# Patient Record
Sex: FEMALE | Race: WHITE
[De-identification: names, ages, dates, MRNs, and addresses within clinical notes are randomized per-mention and may not be internally consistent; named-entity substitution may affect disease eponyms.]

---

## 2023-03-19 ENCOUNTER — HOSPITAL ENCOUNTER (EMERGENCY)
Dept: HOSPITAL 33 - ED | Age: 23
Discharge: HOME | End: 2023-03-19
Payer: COMMERCIAL

## 2023-03-19 VITALS — SYSTOLIC BLOOD PRESSURE: 100 MMHG | DIASTOLIC BLOOD PRESSURE: 67 MMHG

## 2023-03-19 VITALS — HEART RATE: 81 BPM

## 2023-03-19 VITALS — OXYGEN SATURATION: 98 %

## 2023-03-19 DIAGNOSIS — R11.0: ICD-10-CM

## 2023-03-19 DIAGNOSIS — Z79.899: ICD-10-CM

## 2023-03-19 DIAGNOSIS — Z79.52: ICD-10-CM

## 2023-03-19 DIAGNOSIS — J02.9: ICD-10-CM

## 2023-03-19 DIAGNOSIS — L27.0: Primary | ICD-10-CM

## 2023-03-19 DIAGNOSIS — T42.6X5A: ICD-10-CM

## 2023-03-19 LAB
ALBUMIN SERPL-MCNC: 3.8 G/DL (ref 3.5–5)
ALP SERPL-CCNC: 67 U/L (ref 38–126)
ALT SERPL-CCNC: 20 U/L (ref 0–35)
ANION GAP SERPL CALC-SCNC: 8.7 MEQ/L (ref 5–15)
AST SERPL QL: 23 U/L (ref 14–36)
BASOPHILS # BLD AUTO: 0.07 X10^3/UL (ref 0–0.4)
BASOPHILS NFR BLD AUTO: 0.9 % (ref 0–0.4)
BILIRUB BLD-MCNC: 0.4 MG/DL (ref 0.2–1.3)
BUN SERPL-MCNC: 11 MG/DL (ref 7–17)
CALCIUM SPEC-MCNC: 8.6 MG/DL (ref 8.4–10.2)
CHLORIDE SERPL-SCNC: 107 MMOL/L (ref 98–107)
CO2 SERPL-SCNC: 28 MMOL/L (ref 22–30)
CREAT SERPL-MCNC: 0.66 MG/DL (ref 0.52–1.04)
EOSINOPHIL # BLD AUTO: 0.5 X10^3/UL (ref 0–0.5)
FLUAV AG NPH QL IA: NEGATIVE
FLUBV AG NPH QL IA: NEGATIVE
GFR SERPLBLD BASED ON 1.73 SQ M-ARVRAT: > 60 ML/MIN
GLUCOSE SERPL-MCNC: 84 MG/DL (ref 74–106)
HCT VFR BLD AUTO: 37.5 % (ref 35–47)
HGB BLD-MCNC: 12.4 G/DL (ref 12–16)
IMM GRANULOCYTES # BLD: 0.01 X10^3U/L (ref 0–0.03)
IMM GRANULOCYTES NFR BLD: 0.1 % (ref 0–0.4)
LYMPHOCYTES # SPEC AUTO: 2.17 X10^3/UL (ref 1–4.6)
MCH RBC QN AUTO: 29.7 PG (ref 26–32)
MCHC RBC AUTO-ENTMCNC: 33.1 G/DL (ref 32–36)
MONOCYTES # BLD AUTO: 0.42 X10^3/UL (ref 0–1.3)
NRBC # BLD AUTO: 0 X10^3U/L (ref 0–0.01)
NRBC BLD AUTO-RTO: 0 % (ref 0–0.1)
PLATELET # BLD AUTO: 241 X10^3/UL (ref 150–450)
POTASSIUM SERPLBLD-SCNC: 3.2 MMOL/L (ref 3.5–5.1)
PROT SERPL-MCNC: 6.7 G/DL (ref 6.3–8.2)
RBC # BLD AUTO: 4.17 X10^6/UL (ref 4.1–5.4)
RSV AG SPEC QL IA: NEGATIVE
SARS-COV-2 AG RESP QL IA.RAPID: NEGATIVE
SODIUM SERPL-SCNC: 140 MMOL/L (ref 137–145)
WBC # BLD AUTO: 7.5 X10^3/UL (ref 4–10.5)

## 2023-03-19 PROCEDURE — 85025 COMPLETE CBC W/AUTO DIFF WBC: CPT

## 2023-03-19 PROCEDURE — 36415 COLL VENOUS BLD VENIPUNCTURE: CPT

## 2023-03-19 PROCEDURE — 82040 ASSAY OF SERUM ALBUMIN: CPT

## 2023-03-19 PROCEDURE — 84075 ASSAY ALKALINE PHOSPHATASE: CPT

## 2023-03-19 PROCEDURE — 80048 BASIC METABOLIC PNL TOTAL CA: CPT

## 2023-03-19 PROCEDURE — 96372 THER/PROPH/DIAG INJ SC/IM: CPT

## 2023-03-19 PROCEDURE — 87651 STREP A DNA AMP PROBE: CPT

## 2023-03-19 PROCEDURE — 82247 BILIRUBIN TOTAL: CPT

## 2023-03-19 PROCEDURE — 83521 IG LIGHT CHAINS FREE EACH: CPT

## 2023-03-19 PROCEDURE — 99283 EMERGENCY DEPT VISIT LOW MDM: CPT

## 2023-03-19 PROCEDURE — 0241U: CPT

## 2023-03-19 PROCEDURE — 84460 ALANINE AMINO (ALT) (SGPT): CPT

## 2023-03-19 PROCEDURE — 84450 TRANSFERASE (AST) (SGOT): CPT

## 2023-03-19 NOTE — ERPHSYRPT
- History of Present Illness


Source: patient, other (Mother)


Exam Limitations: no limitations


Patient Subjective Stated Complaint: allergic reaction to lamictil, was stopped 

on Wednesday cold turkey.  The rash is somewhat better but I feel like there's 

little bugs crawling on the inside, I'm itching so bad.


Triage Nursing Assessment: pt ambulated into ER without diff, mom at bedside.  

Pt had an allergic reaction to lamictil and was seen at [facility] on Wed, 

03/15/23.  Pt's rash is slightly improved but states, "I'm itching so bad and I 

can't get any relief from it".  Pt has red blotchy rash to arms, legs, belly and

back.  Pt c/o headache.


Physician History: 





23 yo WF w rash x2 wks. Pt was seen in [place] ER on 3/15/23, and it was presumed 

to be caused by Lamictal which was stopped. She was started on a tapering course

of steroids, and Lamictal was stopped. Pt states that the rash is better, but 

pruritis has increased. She has mild nausea and ST but denies 

fever/cough/coryza/swelling of tongue/dysphagia/dyspnea. Pt does have some yared-

orbital edema. 


Timing/Duration: week(s) (2 wks)


Quality: itchy


Severity: mild


Location: generalized


Possible Causes: other (Possible Lamictal)


Associated Symptoms: denies symptoms, sore throat


Allergies/Adverse Reactions: 








lamotrigine [From Lamictal] Adverse Reaction (Severe, Verified 03/19/23 20:41)


   Rash





Home Medications: 








Buspirone HCl 5 mg*** [Buspar 5 mg***] 20 mg PO BID 03/19/23 [History]


Ethinyl Estradiol 1 gm PO DAILY 03/19/23 [History]


Loratadine 10 mg*** [Claritin 10 mg***] 10 mg PO DAILY 03/19/23 [History]


Prednisone 20 mg*** [Deltasone 20 mg***] 40 mg PO DAILY 03/19/23 [History]





Hx Tetanus, Diphtheria Vaccination/Date Given: Yes


Hx Influenza Vaccination/Date Given: No


Hx Pneumococcal Vaccination/Date Given: No





Travel Risk





- International Travel


Have you traveled outside of the country in past 3 weeks: No





- Coronavirus Screening


Are you exhibiting any of the following symptoms?: No


Close contact with a COVID-19 positive Pt in past 14-21 Days: No





- Vaccine Status


Have you recieved a Covid-19 vaccination: Yes


: Pfizer





- Vaccination Dates


Date of 2cond Vaccination (if applicable): 06/09/21





- Review of Systems


Constitutional: No Symptoms


Eyes: No Symptoms, Itchy


Ears, Nose, & Throat: No Symptoms


Respiratory: No Symptoms


Cardiac: No Symptoms


Abdominal/Gastrointestinal: No Symptoms


Genitourinary Symptoms: No Symptoms


Musculoskeletal: No Symptoms


Skin: No Symptoms, Rash


Neurological: No Symptoms


Psychological: No Symptoms


Endocrine: No Symptoms


Hematologic/Lymphatic: No Symptoms


Immunological/Allergic: No Symptoms





- Past Medical History


Neurological History: No Pertinent History


ENT History: No Pertinent History


Cardiac History: No Pertinent History


Respiratory History: No Pertinent History


Endocrine Medical History: No Pertinent History


Musculoskeletal History: No Pertinent History


GI Medical History: No Pertinent History


 History: No Pertinent History


Psycho-Social History: Anxiety, Depression


Female Reproductive Disorders: No Pertinent History





- Past Surgical History


Past Surgical History: No





- Social History


Smoking Status: Former smoker


Exposure to second hand smoke: Yes


Drug Use: none


Patient Lives Alone: No


Significant Family History: no pertinent family hx





- Female History


Hx Last Menstrual Period: 03/01/23


Hx Pregnant Now: No





- Nursing Vital Signs


Nursing Vital Signs: 


                               Initial Vital Signs











Temperature  99.4 F   03/19/23 20:28


 


Pulse Rate  104 H  03/19/23 20:28


 


Respiratory Rate  16   03/19/23 20:28


 


Blood Pressure  127/77   03/19/23 20:28


 


O2 Sat by Pulse Oximetry  98   03/19/23 20:28








                                   Pain Scale











Pain Intensity                 8











Mildly tachy





- Physical Exam


General Appearance: no apparent distress


Eye Exam: PERRL/EOMI, other (Mild yared-orbital edema)


Ears, Nose, Throat Exam: normal ENT inspection, TMs normal, pharynx normal (Mild

 erythema), moist mucous membranes


Neck Exam: normal inspection, non-tender, supple, full range of motion


Respiratory Exam: normal breath sounds, lungs clear, airway intact


Cardiovascular Exam: normal heart sounds, normal peripheral pulses, tachycardia 

(Mild)


Gastrointestinal/Abdomen Exam: soft, normal bowel sounds, No tenderness


Back Exam: normal range of motion


Extremity Exam: normal range of motion


Neurologic Exam: alert, oriented x 3, cooperative, CNs II-XII nml as tested, 

normal mood/affect, nml cerebellar function, nml station & gait, sensation nml, 

No motor deficits, No sensory deficit


Skin Exam: rash (Diffuse, blanching erythematous rash)


Lymphatic Exam: No adenopathy


**SpO2 Interpretation**: normal


SpO2: 98


O2 Delivery: Room Air





- Course


Nursing assessment & vital signs reviewed: Yes


Ordered Tests: 


                               Active Orders 24 hr











 Category Date Time Status


 


 ALBUMIN Stat Lab  03/19/23 21:47 Completed


 


 ALKALINE PHOSPHATASE Stat Lab  03/19/23 21:47 Completed


 


 BILIRUBIN,TOTAL Stat Lab  03/19/23 21:47 Completed


 


 BMP Stat Lab  03/19/23 21:24 Completed


 


 CBC W DIFF Stat Lab  03/19/23 21:24 Completed


 


 SGOT/AST Stat Lab  03/19/23 21:47 Completed


 


 SGPT/ALT Stat Lab  03/19/23 21:47 Completed


 


 Total Protein Stat Lab  03/19/23 21:47 Completed








Medication Summary














Discontinued Medications














Generic Name Dose Route Start Last Admin





  Trade Name Freq  PRN Reason Stop Dose Admin


 


Dexamethasone Sodium Phosphate  10 mg  03/19/23 22:19  03/19/23 22:22





  Dexamethasone Sod Phosphate 10 Mg/Ml  IM  03/19/23 22:20  10 mg





  STAT ONE   Administration


 


Dexamethasone Sodium Phosphate  Confirm  03/19/23 22:20 





  Dexamethasone Sod Phosphate 10 Mg/Ml  Administered  03/19/23 22:21 





  Dose  





  10 mg  





  .ROUTE  





  .STK-MED ONE  


 


Hydroxyzine HCl  50 mg  03/19/23 21:07  03/19/23 21:17





  Hydroxyzine Hcl 100 Mg/2 Ml Vial  IM  03/19/23 21:08  50 mg





  STAT ONE   Administration


 


Hydroxyzine HCl  Confirm  03/19/23 21:17 





  Hydroxyzine Hcl 100 Mg/2 Ml Vial  Administered  03/19/23 21:18 





  Dose  





  100 mg  





  IM  





  .STK-MED ONE  











Lab/Rad Data: 


                           Laboratory Result Diagrams





                                 03/19/23 21:24 





                                 03/19/23 21:24 





                               Laboratory Results











  03/19/23 03/19/23 03/19/23 Range/Units





  21:47 21:24 21:24 


 


WBC     (4.0-10.5)  x10^3/uL


 


RBC     (4.1-5.4)  x10^6/uL


 


Hgb     (12.0-16.0)  g/dL


 


Hct     (35-47)  %


 


MCV     ()  fL


 


MCH     (26-32)  pg


 


MCHC     (32-36)  g/dL


 


RDW     (11.5-14.0)  %


 


Plt Count     (150-450)  x10^3/uL


 


MPV     (7.5-11.0)  fL


 


Gran %     (36.0-66.0)  %


 


Immature Gran % (Auto)     (0.00-0.4)  %


 


Nucleat RBC Rel Count     (0.00-0.1)  %


 


Eos # (Auto)     (0-0.5)  x10^3/uL


 


Immature Gran # (Auto)     (0.00-0.03)  x10^3u/L


 


Absolute Lymphs (auto)     (1.0-4.6)  x10^3/uL


 


Absolute Monos (auto)     (0.0-1.3)  x10^3/uL


 


Absolute Nucleated RBC     (0.00-0.01)  x10^3u/L


 


Lymphocytes %     (24.0-44.0)  %


 


Monocytes %     (0.0-12.0)  %


 


Eosinophils %     (0.00-5.0)  %


 


Basophils %     (0.0-0.4)  %


 


Absolute Granulocytes     (1.4-6.9)  x10^3/uL


 


Basophils #     (0-0.4)  x10^3/uL


 


Sodium     (137-145)  mmol/L


 


Potassium     (3.5-5.1)  mmol/L


 


Chloride     ()  mmol/L


 


Carbon Dioxide     (22-30)  mmol/L


 


Anion Gap     (5-15)  MEQ/L


 


BUN     (7-17)  mg/dL


 


Creatinine     (0.52-1.04)  mg/dL


 


Estimated GFR     ML/MIN


 


Glucose     ()  mg/dL


 


Calcium     (8.4-10.2)  mg/dL


 


Total Bilirubin  0.40    (0.2-1.3)  mg/dL


 


AST  23    (14-36)  U/L


 


ALT  20    (0-35)  U/L


 


Alkaline Phosphatase  67    ()  U/L


 


Serum Total Protein  6.7    (6.3-8.2)  g/dL


 


Albumin  3.8    (3.5-5.0)  g/dL


 


Influenza Type A Ag    NEGATIVE  (NEGATIVE)  


 


Influenza Type B Ag    NEGATIVE  (NEGATIVE)  


 


RSV (PCR)    NEGATIVE  (NEGATIVE)  


 


SARS-CoV-2 (PCR)    NEGATIVE  (NEGATIVE)  


 


Group A Strep Antibody   NOT DETECTED   (NEGATIVE)  














  03/19/23 03/19/23 Range/Units





  21:24 21:24 


 


WBC   7.5  (4.0-10.5)  x10^3/uL


 


RBC   4.17  (4.1-5.4)  x10^6/uL


 


Hgb   12.4  (12.0-16.0)  g/dL


 


Hct   37.5  (35-47)  %


 


MCV   89.9  ()  fL


 


MCH   29.7  (26-32)  pg


 


MCHC   33.1  (32-36)  g/dL


 


RDW   12.5  (11.5-14.0)  %


 


Plt Count   241  (150-450)  x10^3/uL


 


MPV   10.7  (7.5-11.0)  fL


 


Gran %   57.9  (36.0-66.0)  %


 


Immature Gran % (Auto)   0.1  (0.00-0.4)  %


 


Nucleat RBC Rel Count   0.0  (0.00-0.1)  %


 


Eos # (Auto)   0.50  (0-0.5)  x10^3/uL


 


Immature Gran # (Auto)   0.01  (0.00-0.03)  x10^3u/L


 


Absolute Lymphs (auto)   2.17  (1.0-4.6)  x10^3/uL


 


Absolute Monos (auto)   0.42  (0.0-1.3)  x10^3/uL


 


Absolute Nucleated RBC   0.00  (0.00-0.01)  x10^3u/L


 


Lymphocytes %   28.9  (24.0-44.0)  %


 


Monocytes %   5.6  (0.0-12.0)  %


 


Eosinophils %   6.6 H  (0.00-5.0)  %


 


Basophils %   0.9  (0.0-0.4)  %


 


Absolute Granulocytes   4.35  (1.4-6.9)  x10^3/uL


 


Basophils #   0.07  (0-0.4)  x10^3/uL


 


Sodium  140   (137-145)  mmol/L


 


Potassium  3.2 L   (3.5-5.1)  mmol/L


 


Chloride  107   ()  mmol/L


 


Carbon Dioxide  28   (22-30)  mmol/L


 


Anion Gap  8.7   (5-15)  MEQ/L


 


BUN  11   (7-17)  mg/dL


 


Creatinine  0.66   (0.52-1.04)  mg/dL


 


Estimated GFR  > 60.0   ML/MIN


 


Glucose  84   ()  mg/dL


 


Calcium  8.6   (8.4-10.2)  mg/dL


 


Total Bilirubin    (0.2-1.3)  mg/dL


 


AST    (14-36)  U/L


 


ALT    (0-35)  U/L


 


Alkaline Phosphatase    ()  U/L


 


Serum Total Protein    (6.3-8.2)  g/dL


 


Albumin    (3.5-5.0)  g/dL


 


Influenza Type A Ag    (NEGATIVE)  


 


Influenza Type B Ag    (NEGATIVE)  


 


RSV (PCR)    (NEGATIVE)  


 


SARS-CoV-2 (PCR)    (NEGATIVE)  


 


Group A Strep Antibody    (NEGATIVE)  














- Progress


Progress Note: 





03/19/23 22:20


Nursing note and vital signs reviewed


No food or housing insecurities noted


All labs results reviewed and shared w pt/mother


Additional history per mother


50mg IM Vistaril w minimal improvement


10mg IM Decadron


03/19/23 23:29


Etiology of rash unclear but most likely due to Lamictal which has been 

discontinued


03/19/23 23:30





Counseled pt/family regarding: lab results, diagnosis, need for follow-up





- Departure


Departure Disposition: Home


Clinical Impression: 


 Allergic drug rash





Condition: Stable


Critical Care Time: No


Referrals: 


DARYA GUPTA [Primary Care Provider] - Follow up/PCP as directed


Instructions:  Adverse Drug Reactions, Adult (DC)


Additional Instructions: 


Follow up with your mental health therapist or Dr. Gupta in the morning


Atarax as needed for itching and sleep


Finish oral steroids


Return to ER as needed


Prescriptions: 


Hydroxyzine HCl 25 mg*** [Atarax 25 mg***] 25 mg PO Q6H PRN PRN #15 tablet


 PRN Reason: Itching